# Patient Record
Sex: MALE | Race: BLACK OR AFRICAN AMERICAN | Employment: OTHER
[De-identification: names, ages, dates, MRNs, and addresses within clinical notes are randomized per-mention and may not be internally consistent; named-entity substitution may affect disease eponyms.]

---

## 2023-03-27 ENCOUNTER — HOSPITAL ENCOUNTER (EMERGENCY)
Facility: HOSPITAL | Age: 28
Discharge: ANOTHER HOSPITAL | End: 2023-03-27
Attending: EMERGENCY MEDICINE
Payer: OTHER GOVERNMENT

## 2023-03-27 ENCOUNTER — APPOINTMENT (OUTPATIENT)
Facility: HOSPITAL | Age: 28
End: 2023-03-27
Payer: OTHER GOVERNMENT

## 2023-03-27 VITALS
OXYGEN SATURATION: 100 % | BODY MASS INDEX: 26.04 KG/M2 | HEIGHT: 71 IN | TEMPERATURE: 98.4 F | RESPIRATION RATE: 16 BRPM | WEIGHT: 186 LBS | SYSTOLIC BLOOD PRESSURE: 162 MMHG | HEART RATE: 76 BPM | DIASTOLIC BLOOD PRESSURE: 77 MMHG

## 2023-03-27 DIAGNOSIS — S90.811A ABRASION OF RIGHT FOOT, INITIAL ENCOUNTER: ICD-10-CM

## 2023-03-27 DIAGNOSIS — S92.301A MULTIPLE CLOSED FRACTURES OF METATARSAL BONE OF RIGHT FOOT, INITIAL ENCOUNTER: Primary | ICD-10-CM

## 2023-03-27 DIAGNOSIS — S97.81XA CRUSHING INJURY OF RIGHT FOOT, INITIAL ENCOUNTER: ICD-10-CM

## 2023-03-27 PROCEDURE — 29515 APPLICATION SHORT LEG SPLINT: CPT

## 2023-03-27 PROCEDURE — 96375 TX/PRO/DX INJ NEW DRUG ADDON: CPT

## 2023-03-27 PROCEDURE — 2580000003 HC RX 258: Performed by: PHYSICIAN ASSISTANT

## 2023-03-27 PROCEDURE — 96374 THER/PROPH/DIAG INJ IV PUSH: CPT

## 2023-03-27 PROCEDURE — 99285 EMERGENCY DEPT VISIT HI MDM: CPT

## 2023-03-27 PROCEDURE — 96376 TX/PRO/DX INJ SAME DRUG ADON: CPT

## 2023-03-27 PROCEDURE — 73620 X-RAY EXAM OF FOOT: CPT

## 2023-03-27 PROCEDURE — 6360000002 HC RX W HCPCS: Performed by: PHYSICIAN ASSISTANT

## 2023-03-27 RX ORDER — HYDROMORPHONE HYDROCHLORIDE 1 MG/ML
1 INJECTION, SOLUTION INTRAMUSCULAR; INTRAVENOUS; SUBCUTANEOUS
Status: COMPLETED | OUTPATIENT
Start: 2023-03-27 | End: 2023-03-27

## 2023-03-27 RX ORDER — MORPHINE SULFATE 2 MG/ML
2 INJECTION, SOLUTION INTRAMUSCULAR; INTRAVENOUS
Status: DISCONTINUED | OUTPATIENT
Start: 2023-03-27 | End: 2023-03-27

## 2023-03-27 RX ORDER — MORPHINE SULFATE 2 MG/ML
2 INJECTION, SOLUTION INTRAMUSCULAR; INTRAVENOUS
Status: COMPLETED | OUTPATIENT
Start: 2023-03-27 | End: 2023-03-27

## 2023-03-27 RX ORDER — MORPHINE SULFATE 4 MG/ML
4 INJECTION, SOLUTION INTRAMUSCULAR; INTRAVENOUS
Status: COMPLETED | OUTPATIENT
Start: 2023-03-27 | End: 2023-03-27

## 2023-03-27 RX ORDER — 0.9 % SODIUM CHLORIDE 0.9 %
500 INTRAVENOUS SOLUTION INTRAVENOUS ONCE
Status: COMPLETED | OUTPATIENT
Start: 2023-03-27 | End: 2023-03-27

## 2023-03-27 RX ADMIN — SODIUM CHLORIDE 500 ML: 9 INJECTION, SOLUTION INTRAVENOUS at 17:18

## 2023-03-27 RX ADMIN — HYDROMORPHONE HYDROCHLORIDE 1 MG: 1 INJECTION, SOLUTION INTRAMUSCULAR; INTRAVENOUS; SUBCUTANEOUS at 19:07

## 2023-03-27 RX ADMIN — MORPHINE SULFATE 2 MG: 2 INJECTION, SOLUTION INTRAMUSCULAR; INTRAVENOUS at 18:28

## 2023-03-27 RX ADMIN — MORPHINE SULFATE 4 MG: 4 INJECTION INTRAVENOUS at 17:17

## 2023-03-27 ASSESSMENT — PAIN DESCRIPTION - DESCRIPTORS
DESCRIPTORS: SHARP
DESCRIPTORS: THROBBING
DESCRIPTORS: THROBBING
DESCRIPTORS: ACHING

## 2023-03-27 ASSESSMENT — PAIN DESCRIPTION - ORIENTATION
ORIENTATION: RIGHT

## 2023-03-27 ASSESSMENT — PAIN DESCRIPTION - LOCATION
LOCATION: FOOT

## 2023-03-27 ASSESSMENT — PAIN SCALES - GENERAL
PAINLEVEL_OUTOF10: 9
PAINLEVEL_OUTOF10: 10
PAINLEVEL_OUTOF10: 10
PAINLEVEL_OUTOF10: 8
PAINLEVEL_OUTOF10: 10

## 2023-03-27 ASSESSMENT — PAIN DESCRIPTION - PAIN TYPE: TYPE: ACUTE PAIN

## 2023-03-27 ASSESSMENT — PAIN - FUNCTIONAL ASSESSMENT: PAIN_FUNCTIONAL_ASSESSMENT: 0-10

## 2023-03-27 ASSESSMENT — PAIN DESCRIPTION - FREQUENCY: FREQUENCY: CONTINUOUS

## 2023-03-27 NOTE — ED TRIAGE NOTES
Arrived by EMS from [facility]. Patient reports large piece of equipment, >300lbs  fell on patients right foot. Obvious deformity. EMS administered Fentanyl 200mg, Zofran 4mg and Toradol 30mg PTA.

## 2023-03-27 NOTE — ED PROVIDER NOTES
following medications:  Medications   0.9 % sodium chloride bolus (500 mLs IntraVENous New Bag 3/27/23 1718)   morphine sulfate (PF) injection 4 mg (4 mg IntraVENous Given 3/27/23 1717)           Records Reviewed (source and summary): Nursing notes. ED COURSE  ED Course as of 03/27/23 1804   Mon Mar 27, 2023   1752 Case discussed with [address] who recommends transfer to [facility] ED t oEd to  [MD]   1800 Case discussed with Dr. Ralf Blackburn at [facility] ER, who accepts patient for transfer under ED attending Dr. Morenita Melendez [MD]      ED Course User Index  [MD] HALLIE Solis       Medial Decision Making:  DDX: Contusion, fracture, sprain    24 y.o. male  In evaluation of the above differential diagnosis, consideration was given to the following tests and treatments: X-ray of the right foot which shows multiple displaced fractures of the metatarsals. I discussed each of these tests and considerations with the patient. Tetanus UTD, no open fx. patient placed in bulky posterior short leg splint. They agree with the plan of transfer to [facility] ED for Orthopedic speciality and continuity of care within [employer] facility. FINAL IMPRESSION     1. Multiple closed fractures of metatarsal bone of right foot, initial encounter    2. Abrasion of right foot, initial encounter    3. Crushing injury of right foot, initial encounter            DISPOSITION/PLAN   DISPOSITION Decision To Transfer 03/27/2023 05:55:42 PM          I am the Primary Clinician of Record. (Please note that parts of this dictation were completed with voice recognition software. Quite often unanticipated grammatical, syntax, homophones, and other interpretive errors are inadvertently transcribed by the computer software. Please disregards these errors.  Please excuse any errors that have escaped final proofreading.)        Queen Rusty, [id number]  03/27/23 1833